# Patient Record
Sex: MALE | Race: WHITE | NOT HISPANIC OR LATINO | Employment: OTHER | RURAL
[De-identification: names, ages, dates, MRNs, and addresses within clinical notes are randomized per-mention and may not be internally consistent; named-entity substitution may affect disease eponyms.]

---

## 2017-12-21 ENCOUNTER — OFFICE VISIT (OUTPATIENT)
Dept: CARDIOLOGY | Facility: CLINIC | Age: 77
End: 2017-12-21

## 2017-12-21 VITALS
HEART RATE: 60 BPM | DIASTOLIC BLOOD PRESSURE: 84 MMHG | WEIGHT: 180 LBS | SYSTOLIC BLOOD PRESSURE: 122 MMHG | HEIGHT: 60 IN | BODY MASS INDEX: 35.34 KG/M2

## 2017-12-21 DIAGNOSIS — I48.3 TYPICAL ATRIAL FLUTTER (HCC): ICD-10-CM

## 2017-12-21 DIAGNOSIS — I48.0 PAROXYSMAL ATRIAL FIBRILLATION (HCC): Primary | ICD-10-CM

## 2017-12-21 DIAGNOSIS — I42.0 DILATED CARDIOMYOPATHY (HCC): ICD-10-CM

## 2017-12-21 PROCEDURE — 93283 PRGRMG EVAL IMPLANTABLE DFB: CPT | Performed by: INTERNAL MEDICINE

## 2017-12-21 PROCEDURE — 99213 OFFICE O/P EST LOW 20 MIN: CPT | Performed by: INTERNAL MEDICINE

## 2017-12-21 RX ORDER — TORSEMIDE 20 MG/1
20 TABLET ORAL DAILY
COMMUNITY

## 2017-12-21 NOTE — PROGRESS NOTES
Laine Urbina  1940  [phone]      12/21/2017    [facility] CARDIOLOGY     Fariba TA Guillen  [facility] [address] [place] [address]    Chief Complaint   Patient presents with   • Irregular Heart Beat   • Cardiomyopathy       Problem List:   PROBLEM LIST:  1. Nonischemic cardiomyopathy:   a.  History of atrial fibrillation with RVR.   b. Recurrent episodes of atrial fibrillation requiring emergent cardioversion at [facility] (deficient database).  c. Apparent echocardiogram revealing nonischemic cardiomyopathy.  d. Tachybrady  syndrome after initiation of beta blockers, plus amiodarone therapy for atrial fibrillation with RVR.  e. Implantation of dual chamber [employer] ICD for unclear indication per Dr. Lainez, 2013 (no records available of this event).  f. CHADS-VASc  score = 3/Eliquis therapy.  g. Echo 12/3/14 LVEF 40-45% mild MR  2. AFL  a. S/P RFA of RA isthmus dependent flutter Dr VILLARREAL 1/4/16  3. HTN  4. Remote tobacco  use.  5. Osteoarthritis.  6. GERD.   7. History of hypothyroidism.   8. BPH.   9. Remote surgical history:  a.  Remote abdominal surgery age 6 for cyst.  b. Inguinal hernia repair x2 in the right groin.    Allergies  Allergies   Allergen Reactions   • Ace Inhibitors Cough       Current Medications    Current Outpatient Prescriptions:   •  amiodarone (PACERONE) 200 MG tablet, Take 100 mg by mouth daily., Disp: , Rfl:   •  apixaban (ELIQUIS) 5 MG tablet tablet, Take 5 mg by mouth 2 (two) times a day., Disp: , Rfl:   •  aspirin 81 MG EC tablet, Take 81 mg by mouth daily., Disp: , Rfl:   •  atorvastatin (LIPITOR) 20 MG tablet, Take 20 mg by mouth daily., Disp: , Rfl:   •  levothyroxine (SYNTHROID, LEVOTHROID) 100 MCG tablet, Take 100 mcg by mouth daily., Disp: , Rfl:   •  metoprolol succinate XL (TOPROL-XL) 100 MG 24 hr tablet, Take 100 mg by mouth daily., Disp: , Rfl:   •  potassium  "chloride (K-DUR,KLOR-CON) 20 MEQ CR tablet, Take 20 mEq by mouth daily., Disp: , Rfl:   •  tamsulosin (FLOMAX) 0.4 MG capsule 24 hr capsule, Take 1 capsule by mouth every night., Disp: , Rfl:   •  torsemide (DEMADEX) 20 MG tablet, Take 20 mg by mouth Daily., Disp: , Rfl:   •  bumetanide (BUMEX) 0.5 MG tablet, Take 0.5 mg by mouth daily., Disp: , Rfl:     History of Present Illness   HPI    Pt presents for follow up of AF/AFL/DCM. Since the pt has seen us, pt denies any palpitations, SOB, CP, LH, and dizziness. Denies any hospitalizations, ER visits, ICD shocks, or TIA/CVA symptoms. Overall feels well. No side effects to medications. No SE to amiodarone. Has been having problems with DJD.     ROS:  General:  No fatigue, weight gain or loss  Cardiovascular:  Denies CP, PND, syncope, near syncope, edema or palpitations.  Pulmonary:  Denies TALAMANTES, cough, or wheezing    Vitals:    12/21/17 1002   BP: 122/84   BP Location: Right arm   Patient Position: Sitting   Pulse: 60   Weight: 81.6 kg (180 lb)   Height: 69 cm (27.17\")       PE:  General: NAD  Neck: no JVD, no carotid bruits, no TM  Heart RRR, NL S1, S2, S4 present, no rubs, murmurs  Lungs: CTA, no wheezes, rhonchi, or rales  Abd: soft, non-tender, NL BS  Ext: No musculoskeletal deformities, no edema, cyanosis, or clubbing  Psych: normal mood and affect    Diagnostic Data:  Procedures.    1. Paroxysmal atrial fibrillation    2. Typical atrial flutter    3. Dilated cardiomyopathy        ICD interrogation: NL fxn, NL battery fxn, No VT , <1% AF    Plan:  1) AF on amiodarone: Needs LFT/TSH/T4 q 6 M  2) AFL s/p RFA in nsr  3) DCM: on BBL not on ACE/arb talamantes to ?? CKD: repeat echo  Continue present medications. ICD function normal.   4) Anticoagulation  Continue NOAC  5) HTN  Wt loss, exercise, salt reduction    F/up in 6 months         "

## 2017-12-26 ENCOUNTER — RESULTS ENCOUNTER (OUTPATIENT)
Dept: CARDIOLOGY | Facility: CLINIC | Age: 77
End: 2017-12-26

## 2017-12-26 DIAGNOSIS — I48.0 PAROXYSMAL ATRIAL FIBRILLATION (HCC): ICD-10-CM

## 2018-02-08 DIAGNOSIS — I42.0 DILATED CARDIOMYOPATHY (HCC): Primary | ICD-10-CM

## 2018-07-19 ENCOUNTER — CLINICAL SUPPORT NO REQUIREMENTS (OUTPATIENT)
Dept: CARDIOLOGY | Facility: CLINIC | Age: 78
End: 2018-07-19

## 2018-07-19 DIAGNOSIS — Z79.899 HIGH RISK MEDICATION USE: ICD-10-CM

## 2018-07-19 DIAGNOSIS — Z95.810 ICD (IMPLANTABLE CARDIOVERTER-DEFIBRILLATOR), DUAL, IN SITU: Primary | ICD-10-CM

## 2018-07-19 PROCEDURE — 93283 PRGRMG EVAL IMPLANTABLE DFB: CPT | Performed by: INTERNAL MEDICINE

## 2018-07-31 DIAGNOSIS — Z79.899 HIGH RISK MEDICATION USE: Primary | ICD-10-CM

## 2018-12-03 ENCOUNTER — TELEPHONE (OUTPATIENT)
Dept: CARDIOLOGY | Facility: CLINIC | Age: 78
End: 2018-12-03

## 2018-12-03 NOTE — TELEPHONE ENCOUNTER
PT is having TKR on 12/13/2018 and need EP clearance and how long to hold Eliquis and ASA. The surgeon is requesting to hold for 5 days.        T [phone]

## 2018-12-26 ENCOUNTER — TELEPHONE (OUTPATIENT)
Dept: CARDIOLOGY | Facility: CLINIC | Age: 78
End: 2018-12-26

## 2019-03-21 ENCOUNTER — OFFICE VISIT (OUTPATIENT)
Dept: CARDIOLOGY | Facility: CLINIC | Age: 79
End: 2019-03-21

## 2019-03-21 VITALS
HEART RATE: 81 BPM | DIASTOLIC BLOOD PRESSURE: 78 MMHG | BODY MASS INDEX: 27.4 KG/M2 | HEIGHT: 69 IN | SYSTOLIC BLOOD PRESSURE: 138 MMHG | WEIGHT: 185 LBS

## 2019-03-21 DIAGNOSIS — I42.8 NICM (NONISCHEMIC CARDIOMYOPATHY) (HCC): ICD-10-CM

## 2019-03-21 DIAGNOSIS — I10 ESSENTIAL HYPERTENSION: Chronic | ICD-10-CM

## 2019-03-21 DIAGNOSIS — I48.19 PERSISTENT ATRIAL FIBRILLATION (HCC): Primary | ICD-10-CM

## 2019-03-21 DIAGNOSIS — I48.3 TYPICAL ATRIAL FLUTTER (HCC): ICD-10-CM

## 2019-03-21 PROCEDURE — 93283 PRGRMG EVAL IMPLANTABLE DFB: CPT | Performed by: PHYSICIAN ASSISTANT

## 2019-03-21 PROCEDURE — 99213 OFFICE O/P EST LOW 20 MIN: CPT | Performed by: INTERNAL MEDICINE

## 2019-03-21 RX ORDER — METOPROLOL SUCCINATE 50 MG/1
50 TABLET, EXTENDED RELEASE ORAL DAILY
Qty: 30 TABLET | Refills: 11 | Status: SHIPPED | OUTPATIENT
Start: 2019-03-21

## 2019-03-21 NOTE — PROGRESS NOTES
Laine Urbina  1940  [phone]    03/21/2019    [facility] CARDIOLOGY     Nishant Humphries MD  [address] [employer] of [employer] Education  [facility] [address]    Chief Complaint   Patient presents with   • Congestive Heart Failure         PROBLEM LIST:  1. Nonischemic cardiomyopathy:   a.  History of atrial fibrillation with RVR.   b. Recurrent episodes of atrial fibrillation requiring emergent cardioversion at [facility] (deficient database).  c. Apparent echocardiogram revealing nonischemic cardiomyopathy.  d. Tachybrady  syndrome after initiation of beta blockers, plus amiodarone therapy for atrial fibrillation with RVR.  e. Implantation of dual chamber [employer] ICD for unclear indication per Dr. Lainez, 2013 (no records available of this event).  f. CHADS-VASc  score = 3/Eliquis therapy.  g. Echo 12/3/14 LVEF 40-45% mild MR  h. Echocardiogram 2/16/18: EF 55 - 60%, trace MR, trace TR.   2. AFL  a. S/P RFA of RA isthmus dependent flutter Dr VILLARREAL 1/4/16  3. Paroxysmal Atrial Fibrillation  a. CHADSVasc = 4 on Eliquis  b. Treated with Amiodarone  4. HTN  5. Remote tobacco  use.  6. Osteoarthritis.  7. GERD.   8. History of hypothyroidism.   9. BPH.   10. Remote surgical history:  a.  Remote abdominal surgery age 6 for cyst.  b. Inguinal hernia repair x2 in the right groin.  c. Left knee replacement        Allergies  Allergies   Allergen Reactions   • Ace Inhibitors Cough       Current Medications    Current Outpatient Medications:   •  amiodarone (PACERONE) 200 MG tablet, Take 100 mg by mouth daily., Disp: , Rfl:   •  apixaban (ELIQUIS) 5 MG tablet tablet, Take 5 mg by mouth 2 (two) times a day., Disp: , Rfl:   •  aspirin 81 MG EC tablet, Take 81 mg by mouth daily., Disp: , Rfl:   •  atorvastatin (LIPITOR) 20 MG tablet, Take 20 mg by mouth daily., Disp: , Rfl:   •  bumetanide (BUMEX) 0.5 MG tablet, Take 0.5 mg by mouth daily., Disp: , Rfl:   •   "levothyroxine (SYNTHROID, LEVOTHROID) 100 MCG tablet, Take 100 mcg by mouth daily., Disp: , Rfl:   •  metoprolol succinate XL (TOPROL-XL) 100 MG 24 hr tablet, Take 100 mg by mouth daily., Disp: , Rfl:   •  potassium chloride (K-DUR,KLOR-CON) 20 MEQ CR tablet, Take 20 mEq by mouth daily., Disp: , Rfl:   •  tamsulosin (FLOMAX) 0.4 MG capsule 24 hr capsule, Take 1 capsule by mouth every night., Disp: , Rfl:   •  torsemide (DEMADEX) 20 MG tablet, Take 20 mg by mouth Daily., Disp: , Rfl:   •  metoprolol succinate XL (TOPROL-XL) 50 MG 24 hr tablet, Take 1 tablet by mouth Daily. Take 100 mg every morning, and 50 mg every night, Disp: 30 tablet, Rfl: 11    History of Present Illness     Pt presents for follow up of NICM, CHF, AF, and Atrial flutter along with ICD check. Since we last saw the pt, pt denies any AF episodes, SOB, CP, LH, and dizziness, syncope. Denies any hospitalizations other than for a knee replacement surgery. No ER visits, bleeding issues on Eliquis, or TIA/CVA symptoms. Overall feels well. His only complaint is a mild hand tremor when he tries to write. No tremor at rest.     ROS:  General:  Denies fatigue, - weight gain or loss  Cardiovascular:  Denies CP, PND, syncope, near syncope, edema or palpitations.  Pulmonary:  Denies TALAMANTES,- cough, - wheezing      Vitals:    03/21/19 1149   BP: 138/78   BP Location: Left arm   Patient Position: Sitting   Pulse: 81   Weight: 83.9 kg (185 lb)   Height: 175.3 cm (69\")     Body mass index is 27.32 kg/m².  PE:  General: NAD. A & O x 3  Neck: no JVD, no carotid bruits, no TM  Heart RRR, NL S1, S2, S4 present, no rubs, murmurs  Lungs: CTA, no wheezes, rhonchi, or rales  Abd: soft, non-tender, NL BS  Ext: No musculoskeletal deformities, no edema, cyanosis, or clubbing  Psych: normal mood and affect    Diagnostic Data:    ICD Interrogation: normal function. 3% atrial fibrillation, longest > 24 hours. 7 years on battery      ECG 12 Lead  Date/Time: 3/21/2019 12:44 " PM  Performed by: Kaila Michel PA  Authorized by: Kaila Michel PA   Rhythm: sinus rhythm  BPM: 73              1. Persistent atrial fibrillation (CMS/HCC)    2. Typical atrial flutter (CMS/HCC)    3. NICM (nonischemic cardiomyopathy) (CMS/HCC)    4. Essential hypertension          Plan:  1)  AF- overall well controlled on amiodarone, however, is having a potential side effect to Amiodarone of hand tremors. Discussed with Dr. Marlow, will stop the drug and see if it gets better. Consider referral to neurology if no improvement.   Will follow up in 3 months after Amiodarone washout and if having more atrial fibrillation, consider alternative AAD for treatment of atrial fib.  Will increase Metoprolol to 100 mg in AM and 50 mg qhs.   2) AFL s/p RFA in nsr  3) DCM: on BBL not on ACE/arb wheeler to CKD. Last echo 2/2018 was normal.   Continue present medications. ICD function normal.   4) Anticoagulation  Continue NOAC  5) HTN  Wt loss, exercise, salt reduction        F/up in 3 months

## 2019-04-25 ENCOUNTER — TELEPHONE (OUTPATIENT)
Dept: CARDIOLOGY | Facility: CLINIC | Age: 79
End: 2019-04-25

## 2019-04-25 NOTE — TELEPHONE ENCOUNTER
Pt called stating he was shocked yesterday while doing yard work. He starting feeling weak and was walking back to his house when he was shocked.He is feeling okay today and I instructed him to go to the ER if he gets shocked again. We are ordering a latitude monitor for him today and he will call when he receives it for assistance setting it up.

## 2019-09-19 ENCOUNTER — OFFICE VISIT (OUTPATIENT)
Dept: CARDIOLOGY | Facility: CLINIC | Age: 79
End: 2019-09-19

## 2019-09-19 VITALS
WEIGHT: 185 LBS | DIASTOLIC BLOOD PRESSURE: 74 MMHG | SYSTOLIC BLOOD PRESSURE: 134 MMHG | HEIGHT: 69 IN | BODY MASS INDEX: 27.4 KG/M2 | HEART RATE: 73 BPM

## 2019-09-19 DIAGNOSIS — I48.0 PAROXYSMAL ATRIAL FIBRILLATION (HCC): Primary | ICD-10-CM

## 2019-09-19 DIAGNOSIS — I48.3 TYPICAL ATRIAL FLUTTER (HCC): ICD-10-CM

## 2019-09-19 DIAGNOSIS — I42.8 NICM (NONISCHEMIC CARDIOMYOPATHY) (HCC): ICD-10-CM

## 2019-09-19 DIAGNOSIS — I10 ESSENTIAL HYPERTENSION: Chronic | ICD-10-CM

## 2019-09-19 PROCEDURE — 93282 PRGRMG EVAL IMPLANTABLE DFB: CPT | Performed by: INTERNAL MEDICINE

## 2019-09-19 PROCEDURE — 99214 OFFICE O/P EST MOD 30 MIN: CPT | Performed by: INTERNAL MEDICINE

## 2019-09-19 RX ORDER — SOTALOL HYDROCHLORIDE 80 MG/1
80 TABLET ORAL 2 TIMES DAILY
Qty: 180 TABLET | Refills: 2 | Status: SHIPPED | OUTPATIENT
Start: 2019-09-19 | End: 2020-04-21

## 2019-09-19 NOTE — PROGRESS NOTES
Laine Urbina  1940  [phone]    09/19/2019    [facility] CARDIOLOGY     Nishant Humphries MD  [address] [employer] of [employer]  [facility] [address]    Chief Complaint   Patient presents with   • Atrial Fibrillation       Problem List:     1. Nonischemic cardiomyopathy:   a.  History of atrial fibrillation with RVR.   b. Recurrent episodes of atrial fibrillation requiring emergent cardioversion at [facility] (deficient database).  c. Apparent echocardiogram revealing nonischemic cardiomyopathy.  d. Tachybrady  syndrome after initiation of beta blockers, plus amiodarone therapy for atrial fibrillation with RVR.  e. Implantation of dual chamber [employer] ICD for unclear indication per Dr. Lainez, 2013 (no records available of this event).  f. CHADS-VASc  score = 3/Eliquis therapy.  g. Echo 12/3/14 LVEF 40-45% mild MR  h. Echocardiogram 2/16/18: EF 55 - 60%, trace MR, trace TR.   2. AFL  a. S/P RFA of RA isthmus dependent flutter Dr VILLARREAL 1/4/16  3. Paroxysmal Atrial Fibrillation  a. CHADSVasc = 4 on Eliquis  b. Treated with Amiodarone  4. HTN  5. Remote tobacco  use.  6. Osteoarthritis.  7. GERD.   8. History of hypothyroidism.   9. BPH.   10. Remote surgical history:  a.  Remote abdominal surgery age 6 for cyst.  b. Inguinal hernia repair x2 in the right groin.  c. Left knee replacement    Allergies  Allergies   Allergen Reactions   • Ace Inhibitors Cough       Current Medications    Current Outpatient Medications:   •  apixaban (ELIQUIS) 5 MG tablet tablet, Take 5 mg by mouth 2 (two) times a day., Disp: , Rfl:   •  aspirin 81 MG EC tablet, Take 81 mg by mouth daily., Disp: , Rfl:   •  atorvastatin (LIPITOR) 20 MG tablet, Take 20 mg by mouth daily., Disp: , Rfl:   •  levothyroxine (SYNTHROID, LEVOTHROID) 100 MCG tablet, Take 100 mcg by mouth daily., Disp: , Rfl:   •  metoprolol succinate XL (TOPROL-XL) 50 MG 24 hr tablet, Take 1 tablet by mouth  "Daily. Take 100 mg every morning, and 50 mg every night, Disp: 30 tablet, Rfl: 11  •  potassium chloride (K-DUR,KLOR-CON) 20 MEQ CR tablet, Take 20 mEq by mouth daily., Disp: , Rfl:   •  tamsulosin (FLOMAX) 0.4 MG capsule 24 hr capsule, Take 1 capsule by mouth every night., Disp: , Rfl:   •  torsemide (DEMADEX) 20 MG tablet, Take 20 mg by mouth Daily., Disp: , Rfl:   •  amiodarone (PACERONE) 200 MG tablet, Take 100 mg by mouth daily., Disp: , Rfl:     History of Present Illness     Pt presents for follow up of atrial fibrillation, atrial flutter, DCM, and ICD check Since we last saw the pt, the patient had an episode of atrial flutter (noted on device interrogation) with rapid rates and was shocked by his ICD. He did feel dizzy prior to his shock. Of note, his Amiodarone was stopped at his last visit due to tremors. He has been off Amiodarone since March. His tremors have improved. Patient has felt well since his ICD shocked him with no further afib or flutter. NO SOB, CP, LH, and dizziness. Denies any hospitalizations, bleeding on Eliquis, or TIA/CVA symptoms. Overall feels well.    ROS:  General:  Denies fatigue, weight gain or loss  Cardiovascular:  Denies CP, PND, syncope, near syncope, edema or palpitations.  Pulmonary:  Denies TALAMANTES, cough, or wheezing      Vitals:    09/19/19 1357   BP: 134/74   BP Location: Left arm   Patient Position: Sitting   Pulse: 73   Weight: 83.9 kg (185 lb)   Height: 175.3 cm (69\")     Body mass index is 27.32 kg/m².  PE:  General: NAD  Neck: no JVD, no carotid bruits, no TM  Heart RRR, NL S1, S2, S4 present, no rubs, murmurs  Lungs: CTA, no wheezes, rhonchi, or rales  Abd: soft, non-tender, NL BS  Ext: No musculoskeletal deformities, no edema, cyanosis, or clubbing  Psych: normal mood and affect    Diagnostic Data:    ICD interrogation: normal function. On 4/24/19, episode of atrial flutter with RVR, and inappropriate ICD shock with conversion to NSR. 6.5 years on battery. "     Procedures    1. Paroxysmal atrial fibrillation (CMS/HCC)    2. Typical atrial flutter (CMS/HCC)    3. NICM (nonischemic cardiomyopathy) (CMS/HCC)    4. Essential hypertension          Plan:  1)  AF:  - off Amiodarone since 3/2019. He had one episode of AF/flutter with inappropriate ICD shock 4/24/19.  Will initiate sotalol 80 mg p.o. twice daily.  Will change to XL to 50 mg p.o. nightly.  He will need a twelve-lead EKG 48 hours after initiating the sotalol therapy.  2) AFL s/p RFA in nsr  3) DCM: on BBL not on ACE/arb wheeler to CKD. Last echo 2/2018 was normal.   Continue present medications. ICD function normal.  ICD was reprogrammed today for a VF zone at greater than 220 bpm as the patient's LVEF has normalized and in an attempt to avoid inappropriate shocks..  4) Anticoagulation  Continue NOAC  5) HTN  Wt loss, exercise, salt reduction    F/up in 6 months    Scribed for Magdy Marlow MD by Kaila iMchel PA-C. 9/19/2019  2:05 PM     IMagdy MD, personally performed the services described in this documentation as scribed by the above named individual in my presence, and it is both accurate and complete.  9/19/2019  2:16 PM

## 2020-04-21 RX ORDER — SOTALOL HYDROCHLORIDE 80 MG/1
TABLET ORAL
Qty: 180 TABLET | Refills: 2 | Status: SHIPPED | OUTPATIENT
Start: 2020-04-21

## 2021-02-06 NOTE — TELEPHONE ENCOUNTER
Problem: Nutrition Deficit  Goal: Adequate Food and Fluid Intake  Outcome: NOT MET      Why did ihe not get his echo as ordered when he was last seen?

## 2025-07-14 ENCOUNTER — TELEPHONE (OUTPATIENT)
Dept: CARDIAC REHAB | Age: 85
End: 2025-07-14

## 2025-07-14 NOTE — TELEPHONE ENCOUNTER
Patient reports talking to his cardiologist and was told it was not necessary for patient to do cardiac rehab. Pt is not interested at this time.